# Patient Record
Sex: MALE | Race: WHITE | Employment: FULL TIME | URBAN - NONMETROPOLITAN AREA
[De-identification: names, ages, dates, MRNs, and addresses within clinical notes are randomized per-mention and may not be internally consistent; named-entity substitution may affect disease eponyms.]

---

## 2018-03-03 ENCOUNTER — HOSPITAL ENCOUNTER (INPATIENT)
Age: 48
LOS: 2 days | Discharge: HOME | DRG: 390 | End: 2018-03-05
Attending: FAMILY MEDICINE | Admitting: SURGERY
Payer: COMMERCIAL

## 2018-03-03 ENCOUNTER — APPOINTMENT (OUTPATIENT)
Dept: CT IMAGING | Age: 48
DRG: 390 | End: 2018-03-03
Payer: COMMERCIAL

## 2018-03-03 DIAGNOSIS — K56.609 SMALL BOWEL OBSTRUCTION (HCC): Primary | ICD-10-CM

## 2018-03-03 DIAGNOSIS — R10.9 ABDOMINAL PAIN, UNSPECIFIED ABDOMINAL LOCATION: ICD-10-CM

## 2018-03-03 LAB
ALBUMIN SERPL-MCNC: 5.1 G/DL (ref 3.5–5.2)
ALP BLD-CCNC: 106 U/L (ref 40–130)
ALT SERPL-CCNC: 35 U/L (ref 5–41)
AMYLASE: 97 U/L (ref 28–100)
ANION GAP SERPL CALCULATED.3IONS-SCNC: 15 MMOL/L (ref 7–19)
AST SERPL-CCNC: 40 U/L (ref 5–40)
BASOPHILS ABSOLUTE: 0 K/UL (ref 0–0.2)
BASOPHILS RELATIVE PERCENT: 0.1 % (ref 0–1)
BILIRUB SERPL-MCNC: 0.5 MG/DL (ref 0.2–1.2)
BUN BLDV-MCNC: 8 MG/DL (ref 6–20)
C-REACTIVE PROTEIN: 1.17 MG/DL (ref 0–0.5)
CALCIUM SERPL-MCNC: 10.7 MG/DL (ref 8.6–10)
CHLORIDE BLD-SCNC: 94 MMOL/L (ref 98–111)
CO2: 30 MMOL/L (ref 22–29)
CREAT SERPL-MCNC: 0.8 MG/DL (ref 0.5–1.2)
EOSINOPHILS ABSOLUTE: 0 K/UL (ref 0–0.6)
EOSINOPHILS RELATIVE PERCENT: 0.1 % (ref 0–5)
GFR NON-AFRICAN AMERICAN: >60
GLUCOSE BLD-MCNC: 137 MG/DL (ref 74–109)
HCT VFR BLD CALC: 47.9 % (ref 42–52)
HEMOGLOBIN: 15.9 G/DL (ref 14–18)
LACTIC ACID: 1.6 MMOL/L (ref 0.5–1.9)
LIPASE: 18 U/L (ref 13–60)
LYMPHOCYTES ABSOLUTE: 1.2 K/UL (ref 1.1–4.5)
LYMPHOCYTES RELATIVE PERCENT: 7.4 % (ref 20–40)
MCH RBC QN AUTO: 29.7 PG (ref 27–31)
MCHC RBC AUTO-ENTMCNC: 33.2 G/DL (ref 33–37)
MCV RBC AUTO: 89.5 FL (ref 80–94)
MONOCYTES ABSOLUTE: 0.5 K/UL (ref 0–0.9)
MONOCYTES RELATIVE PERCENT: 2.7 % (ref 0–10)
NEUTROPHILS ABSOLUTE: 14.9 K/UL (ref 1.5–7.5)
NEUTROPHILS RELATIVE PERCENT: 89.3 % (ref 50–65)
PDW BLD-RTO: 13 % (ref 11.5–14.5)
PLATELET # BLD: 428 K/UL (ref 130–400)
PMV BLD AUTO: 8.7 FL (ref 9.4–12.4)
POTASSIUM SERPL-SCNC: 4.3 MMOL/L (ref 3.5–5)
RBC # BLD: 5.35 M/UL (ref 4.7–6.1)
SODIUM BLD-SCNC: 139 MMOL/L (ref 136–145)
TOTAL PROTEIN: 8.8 G/DL (ref 6.6–8.7)
WBC # BLD: 16.7 K/UL (ref 4.8–10.8)

## 2018-03-03 PROCEDURE — 74177 CT ABD & PELVIS W/CONTRAST: CPT

## 2018-03-03 PROCEDURE — 36415 COLL VENOUS BLD VENIPUNCTURE: CPT

## 2018-03-03 PROCEDURE — 2580000003 HC RX 258: Performed by: FAMILY MEDICINE

## 2018-03-03 PROCEDURE — 6360000002 HC RX W HCPCS: Performed by: FAMILY MEDICINE

## 2018-03-03 PROCEDURE — 94640 AIRWAY INHALATION TREATMENT: CPT

## 2018-03-03 PROCEDURE — 99285 EMERGENCY DEPT VISIT HI MDM: CPT | Performed by: FAMILY MEDICINE

## 2018-03-03 PROCEDURE — 83690 ASSAY OF LIPASE: CPT

## 2018-03-03 PROCEDURE — 83605 ASSAY OF LACTIC ACID: CPT

## 2018-03-03 PROCEDURE — 96376 TX/PRO/DX INJ SAME DRUG ADON: CPT

## 2018-03-03 PROCEDURE — 87040 BLOOD CULTURE FOR BACTERIA: CPT

## 2018-03-03 PROCEDURE — 86140 C-REACTIVE PROTEIN: CPT

## 2018-03-03 PROCEDURE — 2580000003 HC RX 258: Performed by: SURGERY

## 2018-03-03 PROCEDURE — 96375 TX/PRO/DX INJ NEW DRUG ADDON: CPT

## 2018-03-03 PROCEDURE — 80053 COMPREHEN METABOLIC PANEL: CPT

## 2018-03-03 PROCEDURE — 99285 EMERGENCY DEPT VISIT HI MDM: CPT

## 2018-03-03 PROCEDURE — 2500000003 HC RX 250 WO HCPCS: Performed by: FAMILY MEDICINE

## 2018-03-03 PROCEDURE — 96374 THER/PROPH/DIAG INJ IV PUSH: CPT

## 2018-03-03 PROCEDURE — 1210000000 HC MED SURG R&B

## 2018-03-03 PROCEDURE — 85025 COMPLETE CBC W/AUTO DIFF WBC: CPT

## 2018-03-03 PROCEDURE — 6360000004 HC RX CONTRAST MEDICATION: Performed by: FAMILY MEDICINE

## 2018-03-03 PROCEDURE — 6360000002 HC RX W HCPCS: Performed by: SURGERY

## 2018-03-03 PROCEDURE — 82150 ASSAY OF AMYLASE: CPT

## 2018-03-03 RX ORDER — LIDOCAINE HYDROCHLORIDE 20 MG/ML
5 INJECTION, SOLUTION EPIDURAL; INFILTRATION; INTRACAUDAL; PERINEURAL ONCE
Status: COMPLETED | OUTPATIENT
Start: 2018-03-03 | End: 2018-03-03

## 2018-03-03 RX ORDER — SODIUM CHLORIDE 0.9 % (FLUSH) 0.9 %
10 SYRINGE (ML) INJECTION PRN
Status: DISCONTINUED | OUTPATIENT
Start: 2018-03-03 | End: 2018-03-05 | Stop reason: HOSPADM

## 2018-03-03 RX ORDER — SODIUM CHLORIDE 9 MG/ML
INJECTION, SOLUTION INTRAVENOUS CONTINUOUS
Status: DISCONTINUED | OUTPATIENT
Start: 2018-03-03 | End: 2018-03-05

## 2018-03-03 RX ORDER — ONDANSETRON 2 MG/ML
4 INJECTION INTRAMUSCULAR; INTRAVENOUS ONCE
Status: COMPLETED | OUTPATIENT
Start: 2018-03-03 | End: 2018-03-03

## 2018-03-03 RX ORDER — MORPHINE SULFATE 4 MG/ML
4 INJECTION, SOLUTION INTRAMUSCULAR; INTRAVENOUS ONCE
Status: COMPLETED | OUTPATIENT
Start: 2018-03-03 | End: 2018-03-03

## 2018-03-03 RX ORDER — MORPHINE SULFATE 4 MG/ML
2 INJECTION, SOLUTION INTRAMUSCULAR; INTRAVENOUS
Status: DISCONTINUED | OUTPATIENT
Start: 2018-03-03 | End: 2018-03-05 | Stop reason: HOSPADM

## 2018-03-03 RX ORDER — HYDROMORPHONE HCL 110MG/55ML
0.25 PATIENT CONTROLLED ANALGESIA SYRINGE INTRAVENOUS
Status: DISCONTINUED | OUTPATIENT
Start: 2018-03-03 | End: 2018-03-03 | Stop reason: RX

## 2018-03-03 RX ORDER — OXYCODONE AND ACETAMINOPHEN 10; 325 MG/1; MG/1
1 TABLET ORAL EVERY 6 HOURS PRN
COMMUNITY

## 2018-03-03 RX ORDER — HYDROMORPHONE HCL 110MG/55ML
1 PATIENT CONTROLLED ANALGESIA SYRINGE INTRAVENOUS ONCE
Status: COMPLETED | OUTPATIENT
Start: 2018-03-03 | End: 2018-03-03

## 2018-03-03 RX ORDER — ACETAMINOPHEN 325 MG/1
650 TABLET ORAL EVERY 4 HOURS PRN
Status: DISCONTINUED | OUTPATIENT
Start: 2018-03-03 | End: 2018-03-05 | Stop reason: HOSPADM

## 2018-03-03 RX ORDER — MORPHINE SULFATE 4 MG/ML
4 INJECTION, SOLUTION INTRAMUSCULAR; INTRAVENOUS
Status: DISCONTINUED | OUTPATIENT
Start: 2018-03-03 | End: 2018-03-05 | Stop reason: HOSPADM

## 2018-03-03 RX ORDER — SODIUM CHLORIDE 0.9 % (FLUSH) 0.9 %
10 SYRINGE (ML) INJECTION EVERY 12 HOURS SCHEDULED
Status: DISCONTINUED | OUTPATIENT
Start: 2018-03-03 | End: 2018-03-05 | Stop reason: HOSPADM

## 2018-03-03 RX ORDER — HYDROMORPHONE HCL 110MG/55ML
0.5 PATIENT CONTROLLED ANALGESIA SYRINGE INTRAVENOUS
Status: DISCONTINUED | OUTPATIENT
Start: 2018-03-03 | End: 2018-03-03 | Stop reason: RX

## 2018-03-03 RX ORDER — SODIUM CHLORIDE 9 MG/ML
INJECTION, SOLUTION INTRAVENOUS CONTINUOUS
Status: DISCONTINUED | OUTPATIENT
Start: 2018-03-03 | End: 2018-03-05 | Stop reason: HOSPADM

## 2018-03-03 RX ORDER — ONDANSETRON 2 MG/ML
4 INJECTION INTRAMUSCULAR; INTRAVENOUS EVERY 8 HOURS PRN
Status: DISCONTINUED | OUTPATIENT
Start: 2018-03-03 | End: 2018-03-05 | Stop reason: HOSPADM

## 2018-03-03 RX ADMIN — SODIUM CHLORIDE: 9 INJECTION, SOLUTION INTRAVENOUS at 16:41

## 2018-03-03 RX ADMIN — HYDROMORPHONE HYDROCHLORIDE 1 MG: 2 INJECTION INTRAMUSCULAR; INTRAVENOUS; SUBCUTANEOUS at 17:29

## 2018-03-03 RX ADMIN — LIDOCAINE HYDROCHLORIDE 5 ML: 20 INJECTION, SOLUTION EPIDURAL; INFILTRATION; INTRACAUDAL; PERINEURAL at 18:58

## 2018-03-03 RX ADMIN — ONDANSETRON 4 MG: 2 INJECTION INTRAMUSCULAR; INTRAVENOUS at 17:29

## 2018-03-03 RX ADMIN — ONDANSETRON 4 MG: 2 INJECTION INTRAMUSCULAR; INTRAVENOUS at 16:41

## 2018-03-03 RX ADMIN — ONDANSETRON 4 MG: 2 INJECTION INTRAMUSCULAR; INTRAVENOUS at 22:38

## 2018-03-03 RX ADMIN — Medication 4 MG: at 22:38

## 2018-03-03 RX ADMIN — HYDROMORPHONE HYDROCHLORIDE 1 MG: 2 INJECTION INTRAMUSCULAR; INTRAVENOUS; SUBCUTANEOUS at 19:34

## 2018-03-03 RX ADMIN — Medication 4 MG: at 16:41

## 2018-03-03 RX ADMIN — IOPAMIDOL 90 ML: 755 INJECTION, SOLUTION INTRAVENOUS at 16:55

## 2018-03-03 RX ADMIN — SODIUM CHLORIDE: 9 INJECTION, SOLUTION INTRAVENOUS at 22:39

## 2018-03-03 ASSESSMENT — ENCOUNTER SYMPTOMS
ABDOMINAL DISTENTION: 0
WHEEZING: 0
ABDOMINAL PAIN: 1
SHORTNESS OF BREATH: 0
DIARRHEA: 0
CONSTIPATION: 0
SORE THROAT: 0
COUGH: 0
BACK PAIN: 0
PHOTOPHOBIA: 0
NAUSEA: 0

## 2018-03-03 ASSESSMENT — PAIN DESCRIPTION - LOCATION
LOCATION: ABDOMEN
LOCATION: ABDOMEN

## 2018-03-03 ASSESSMENT — PAIN DESCRIPTION - PAIN TYPE: TYPE: ACUTE PAIN

## 2018-03-03 ASSESSMENT — PAIN SCALES - GENERAL
PAINLEVEL_OUTOF10: 7
PAINLEVEL_OUTOF10: 7
PAINLEVEL_OUTOF10: 9

## 2018-03-03 NOTE — ED PROVIDER NOTES
[facility]  eMERGENCY dEPARTMENT eNCOUnter      Pt Name: German Durant  MRN: [id number]  [place] 1970  Date of evaluation: 3/3/2018  Provider: Priscila Dsouza MD    [address] [place] [address]       Chief Complaint   Patient presents with    Abdominal Pain     Abd pain x 12 hours         HISTORY OF PRESENT ILLNESS   (Location/Symptom, Timing/Onset, Context/Setting, Quality, Duration, Modifying Factors, Severity)  Note limiting factors. German Durant is a 50 y.o. male who presents to the emergency department Complaining of abdominal pain. Patient states that this pain began approximately 13 hours ago and states that it is midline lower and movement seems to make it worse. He has noticed that he'll have occasional episodes where the pain intensified and then will ease up. He denies any burning with urination no constipation or diarrhea. HPI    Nursing Notes were reviewed. REVIEW OF SYSTEMS    (2-9 systems for level 4, 10 or more for level 5)     Review of Systems   Constitutional: Positive for activity change and fatigue. Negative for appetite change, chills, diaphoresis and fever. HENT: Negative for congestion and sore throat. Eyes: Negative for photophobia and visual disturbance. Respiratory: Negative for cough, shortness of breath and wheezing. Cardiovascular: Negative for chest pain, palpitations and leg swelling. Gastrointestinal: Positive for abdominal pain. Negative for abdominal distention, constipation, diarrhea and nausea. Endocrine: Negative for cold intolerance and heat intolerance. Genitourinary: Negative for difficulty urinating and dysuria. Musculoskeletal: Negative for back pain. Skin: Negative for rash. Neurological: Positive for weakness. Negative for dizziness, seizures and syncope. Hematological: Negative for adenopathy. Psychiatric/Behavioral: Negative for agitation, confusion and suicidal ideas.             PAST MEDICAL HISTORY     Past Medical History: Diagnosis Date    Arthritis     Hypertension     Low back pain radiating to lower extremity 12/23/2015         SURGICAL HISTORY       Past Surgical History:   Procedure Laterality Date    APPENDECTOMY      BACK SURGERY      CHOLECYSTECTOMY, LAPAROSCOPIC      EYE SURGERY      KNEE CARTILAGE SURGERY           CURRENT MEDICATIONS       Current Discharge Medication List      CONTINUE these medications which have NOT CHANGED    Details   LOSARTAN POTASSIUM PO Take by mouth      cyclobenzaprine (FLEXERIL) 10 MG tablet Take 10 mg by mouth 3 times daily as needed  Refills: 3      etodolac (LODINE) 400 MG tablet Take 400 mg by mouth daily  Refills: 11      HYDROcodone-acetaminophen (NORCO)  MG per tablet Take 1 tablet by mouth 4 times daily as needed  Refills: 0      lidocaine (LIDODERM) 5 % Place 1 patch onto the skin every 12 hours  Refills: 3      CIALIS 5 MG tablet Take 1 tablet by mouth daily as needed  Refills: 6             ALLERGIES     Cymbalta [duloxetine hcl]    FAMILY HISTORY       Family History   Problem Relation Age of Onset    Osteoarthritis Mother     Heart Failure Mother           SOCIAL HISTORY       Social History     Social History    Marital status:      Spouse name: N/A    Number of children: N/A    Years of education: N/A     Occupational History    INSURANCE AGENCY      Social History Main Topics    Smoking status: Former Smoker     Quit date: 12/16/1995    Smokeless tobacco: Never Used    Alcohol use No    Drug use: No    Sexual activity: Not Asked     Other Topics Concern    None     Social History Narrative    None       SCREENINGS             PHYSICAL EXAM    (up to 7 for level 4, 8 or more for level 5)     ED Triage Vitals [03/03/18 1621]   BP Temp Temp Source Pulse Resp SpO2 Height Weight   (!) 149/101 98.3 °F (36.8 °C) Oral 99 22 97 % 6' 2\" (1.88 m) 210 lb (95.3 kg)       Physical Exam   Constitutional: He is oriented to person, place, and time.  He appears

## 2018-03-04 ENCOUNTER — APPOINTMENT (OUTPATIENT)
Dept: GENERAL RADIOLOGY | Age: 48
DRG: 390 | End: 2018-03-04
Payer: COMMERCIAL

## 2018-03-04 LAB
HCT VFR BLD CALC: 39.6 % (ref 42–52)
HEMOGLOBIN: 12.9 G/DL (ref 14–18)
MCH RBC QN AUTO: 29.5 PG (ref 27–31)
MCHC RBC AUTO-ENTMCNC: 32.6 G/DL (ref 33–37)
MCV RBC AUTO: 90.4 FL (ref 80–94)
PDW BLD-RTO: 13.1 % (ref 11.5–14.5)
PLATELET # BLD: 328 K/UL (ref 130–400)
PMV BLD AUTO: 8.5 FL (ref 9.4–12.4)
RBC # BLD: 4.38 M/UL (ref 4.7–6.1)
WBC # BLD: 9.3 K/UL (ref 4.8–10.8)

## 2018-03-04 PROCEDURE — 74250 X-RAY XM SM INT 1CNTRST STD: CPT

## 2018-03-04 PROCEDURE — 2580000003 HC RX 258: Performed by: SURGERY

## 2018-03-04 PROCEDURE — 6360000002 HC RX W HCPCS: Performed by: FAMILY MEDICINE

## 2018-03-04 PROCEDURE — 6370000000 HC RX 637 (ALT 250 FOR IP): Performed by: SURGERY

## 2018-03-04 PROCEDURE — 36415 COLL VENOUS BLD VENIPUNCTURE: CPT

## 2018-03-04 PROCEDURE — 85027 COMPLETE CBC AUTOMATED: CPT

## 2018-03-04 PROCEDURE — 6360000002 HC RX W HCPCS: Performed by: SURGERY

## 2018-03-04 PROCEDURE — 1210000000 HC MED SURG R&B

## 2018-03-04 PROCEDURE — 99222 1ST HOSP IP/OBS MODERATE 55: CPT | Performed by: SURGERY

## 2018-03-04 RX ORDER — LIDOCAINE 50 MG/G
1 PATCH TOPICAL EVERY 12 HOURS SCHEDULED
Status: DISCONTINUED | OUTPATIENT
Start: 2018-03-04 | End: 2018-03-05 | Stop reason: HOSPADM

## 2018-03-04 RX ADMIN — Medication 4 MG: at 11:01

## 2018-03-04 RX ADMIN — Medication 4 MG: at 22:01

## 2018-03-04 RX ADMIN — Medication 4 MG: at 07:42

## 2018-03-04 RX ADMIN — Medication 10 ML: at 07:43

## 2018-03-04 RX ADMIN — Medication 4 MG: at 18:00

## 2018-03-04 RX ADMIN — SODIUM CHLORIDE: 9 INJECTION, SOLUTION INTRAVENOUS at 04:30

## 2018-03-04 RX ADMIN — ONDANSETRON 4 MG: 2 INJECTION INTRAMUSCULAR; INTRAVENOUS at 12:43

## 2018-03-04 RX ADMIN — SODIUM CHLORIDE: 9 INJECTION, SOLUTION INTRAVENOUS at 20:32

## 2018-03-04 RX ADMIN — CHLORASEPTIC 1 SPRAY: 1.5 LIQUID ORAL at 22:59

## 2018-03-04 ASSESSMENT — PAIN SCALES - GENERAL
PAINLEVEL_OUTOF10: 3
PAINLEVEL_OUTOF10: 6
PAINLEVEL_OUTOF10: 6
PAINLEVEL_OUTOF10: 3
PAINLEVEL_OUTOF10: 5
PAINLEVEL_OUTOF10: 7

## 2018-03-04 ASSESSMENT — ENCOUNTER SYMPTOMS
ABDOMINAL PAIN: 1
DIARRHEA: 0
BLURRED VISION: 0
NAUSEA: 1
DOUBLE VISION: 0
SORE THROAT: 0
VOMITING: 1
CONSTIPATION: 0
COUGH: 0
SHORTNESS OF BREATH: 0
BACK PAIN: 1

## 2018-03-04 ASSESSMENT — PAIN DESCRIPTION - LOCATION: LOCATION: ABDOMEN

## 2018-03-04 ASSESSMENT — PAIN DESCRIPTION - PAIN TYPE: TYPE: ACUTE PAIN

## 2018-03-04 NOTE — H&P
Claudette Morrwo is an 50 y.o.  male. Mr. Magui Mccarthy is a 50year old male who presented to the ER yesterday with a complaint of severe abdominal pain. He reports that this pain started suddenly yesterday morning around 3 am, and woke him from sleep. The pain is in the lower abdomen. He describes it as sharp, dull, and crampy. It comes in waves and progressively worsened throughout the day. He was vomiting yesterday. He had a bowel movement yesterday morning which he states was normal. He came in to the ER where a CT was done showing likely bowel obstruction. An NGT was placed. He has had another BM since being in the hospital, and has been passing some gas.     Past Medical History:   Diagnosis Date    Arthritis     Hypertension     Low back pain radiating to lower extremity 12/23/2015     Past Surgical History:   Procedure Laterality Date    APPENDECTOMY      BACK SURGERY      CHOLECYSTECTOMY, LAPAROSCOPIC      EYE SURGERY      KNEE CARTILAGE SURGERY       Current Facility-Administered Medications   Medication Dose Route Frequency Provider Last Rate Last Dose    0.9 % sodium chloride infusion   Intravenous Continuous Donna Urbina MD   Stopped at 03/03/18 1845    sodium chloride flush 0.9 % injection 10 mL  10 mL Intravenous 2 times per day William Jaramillo MD   10 mL at 03/04/18 0743    sodium chloride flush 0.9 % injection 10 mL  10 mL Intravenous PRN William Jaramillo MD        acetaminophen (TYLENOL) tablet 650 mg  650 mg Oral Q4H PRN William Jaramillo MD        0.9 % sodium chloride infusion   Intravenous Continuous William Jaramillo  mL/hr at 03/04/18 0430      ondansetron (ZOFRAN) injection 4 mg  4 mg Intravenous Q8H PRN William Jaramillo MD   4 mg at 03/03/18 2238    morphine injection 2 mg  2 mg Intravenous Q3H PRN Donna Urbina MD        Or    morphine injection 4 mg  4 mg Intravenous Q3H PRN Donna Urbina MD   4 mg at 03/04/18 1101     Allergies: Cymbalta [duloxetine hcl]  Family History

## 2018-03-04 NOTE — PLAN OF CARE
Problem: Pain:  Goal: Pain level will decrease  Pain level will decrease  Outcome: Ongoing    Goal: Control of acute pain  Control of acute pain  Outcome: Ongoing

## 2018-03-05 VITALS
SYSTOLIC BLOOD PRESSURE: 145 MMHG | DIASTOLIC BLOOD PRESSURE: 77 MMHG | HEART RATE: 114 BPM | OXYGEN SATURATION: 98 % | WEIGHT: 210 LBS | TEMPERATURE: 98.4 F | RESPIRATION RATE: 16 BRPM | HEIGHT: 74 IN | BODY MASS INDEX: 26.95 KG/M2

## 2018-03-05 PROCEDURE — 99232 SBSQ HOSP IP/OBS MODERATE 35: CPT | Performed by: SURGERY

## 2018-03-05 PROCEDURE — 2580000003 HC RX 258: Performed by: SURGERY

## 2018-03-05 PROCEDURE — 6360000002 HC RX W HCPCS: Performed by: FAMILY MEDICINE

## 2018-03-05 PROCEDURE — 6370000000 HC RX 637 (ALT 250 FOR IP): Performed by: SURGERY

## 2018-03-05 RX ADMIN — Medication 4 MG: at 07:12

## 2018-03-05 RX ADMIN — SODIUM CHLORIDE: 9 INJECTION, SOLUTION INTRAVENOUS at 04:20

## 2018-03-05 ASSESSMENT — PAIN SCALES - GENERAL
PAINLEVEL_OUTOF10: 4
PAINLEVEL_OUTOF10: 8

## 2018-03-05 ASSESSMENT — PAIN DESCRIPTION - LOCATION: LOCATION: BACK

## 2018-03-05 ASSESSMENT — PAIN DESCRIPTION - PAIN TYPE: TYPE: CHRONIC PAIN

## 2018-03-08 LAB
BLOOD CULTURE, ROUTINE: NORMAL
CULTURE, BLOOD 2: NORMAL

## 2018-04-07 NOTE — DISCHARGE SUMMARY
Physician Discharge Summary     Patient ID:  Pa Gould  [id number]  43 y.o.  1970    Admit date: 3/3/2018    Discharge date and time: 3/5/2018  1:27 PM     Admitting Physician: Rick Coleman MD     Discharge Physician: Rick Coleman      Admission Diagnoses: Small bowel obstruction [K56.609]    Discharge Diagnoses: same, with obstipation    Admission Condition: fair    Discharged Condition: good    Indication for Admission: severe abdominal pain, bowel obstruction    Hospital Course: the patient was admitted through the ER, an NGT was placed, and he was placed on bowel rest. His bowel function started to improve, and a SBFT was done, which showed resolution of any obstruction and induced further bowel function. It was determined he was stable to be discharged home. Consults: none    Significant Diagnostic Studies: labs and radiology    Treatments: IV hydration and analgesia    Discharge Exam:  See progress note    Disposition: home    In process/preliminary results:  Outstanding Order Results     No orders found from 2/2/2018 to 3/4/2018. Patient Instructions:   Discharge Medication List as of 3/5/2018  1:08 PM      CONTINUE these medications which have NOT CHANGED    Details   LOSARTAN POTASSIUM PO Take 25 mg by mouth Historical Med      oxyCODONE-acetaminophen (PERCOCET)  MG per tablet Take 1 tablet by mouth every 6 hours as needed for Pain. Historical Med      cyclobenzaprine (FLEXERIL) 10 MG tablet Take 10 mg by mouth 3 times daily as needed, R-3      etodolac (LODINE) 400 MG tablet Take 400 mg by mouth daily as needed , R-11Historical Med      lidocaine (LIDODERM) 5 % Place 1 patch onto the skin every 12 hours, R-3      CIALIS 5 MG tablet Take 1 tablet by mouth daily as needed, R-6, DAVID         STOP taking these medications       lisinopril (PRINIVIL;ZESTRIL) 5 MG tablet Comments:   Reason for Stopping:             Activity: activity as tolerated  Diet: full liquids and low residue,